# Patient Record
Sex: MALE | Race: WHITE | Employment: FULL TIME | URBAN - METROPOLITAN AREA
[De-identification: names, ages, dates, MRNs, and addresses within clinical notes are randomized per-mention and may not be internally consistent; named-entity substitution may affect disease eponyms.]

---

## 2023-10-02 ENCOUNTER — OFFICE VISIT (OUTPATIENT)
Age: 33
End: 2023-10-02

## 2023-10-02 ENCOUNTER — TELEPHONE (OUTPATIENT)
Age: 33
End: 2023-10-02

## 2023-10-02 VITALS
OXYGEN SATURATION: 98 % | BODY MASS INDEX: 26.4 KG/M2 | DIASTOLIC BLOOD PRESSURE: 119 MMHG | SYSTOLIC BLOOD PRESSURE: 167 MMHG | HEIGHT: 73 IN | HEART RATE: 108 BPM | WEIGHT: 199.2 LBS | TEMPERATURE: 98.5 F | RESPIRATION RATE: 26 BRPM

## 2023-10-02 DIAGNOSIS — G51.0: Primary | ICD-10-CM

## 2023-10-02 NOTE — PATIENT INSTRUCTIONS
GO TO ER FOR FURTHER EVALUATION & MANAGEMENT. Discussed potential concerns for: worsening symptoms of bells palsy now extending to left side. NEEDS CT SCAN. Recommend patient to seek care at the nearest emergency department for further evaluation.     Transportation:  private car